# Patient Record
Sex: FEMALE | URBAN - METROPOLITAN AREA
[De-identification: names, ages, dates, MRNs, and addresses within clinical notes are randomized per-mention and may not be internally consistent; named-entity substitution may affect disease eponyms.]

---

## 2020-02-17 ENCOUNTER — HOSPITAL ENCOUNTER (OUTPATIENT)
Age: 85
Setting detail: SPECIMEN
Discharge: HOME | End: 2020-02-17
Payer: MEDICARE

## 2020-02-19 VITALS
WEIGHT: 132 LBS | DIASTOLIC BLOOD PRESSURE: 87 MMHG | SYSTOLIC BLOOD PRESSURE: 120 MMHG | RESPIRATION RATE: 18 BRPM | TEMPERATURE: 97.9 F | HEART RATE: 89 BPM

## 2020-02-19 PROBLEM — R62.7 FTT (FAILURE TO THRIVE) IN ADULT: Status: ACTIVE | Noted: 2020-02-19

## 2020-02-19 PROBLEM — S62.102A CLOSED FRACTURE OF LEFT WRIST: Status: ACTIVE | Noted: 2020-02-19

## 2020-02-19 NOTE — PROGRESS NOTES
Tommie Cordero is a 81 y.o. female that is being seen at [facility] for Other (Left arm fracture)      Tommie Cordero  1935 2/19/20      HPI:  Patient was not seen on 2/21/20 due to not being available on rounds. I have reviewed the patient's past medical history, past surgical history, allergies,medications, social and family history and I have made updates where appropriate. No past medical history on file. No past surgical history on file. Social History     Tobacco Use    Smoking status: Not on file   Substance Use Topics    Alcohol use: Not on file    Drug use: Not on file       No family history on file. Review of Systems        PHYSICAL EXAM:    /87   Pulse 89   Temp 97.9 °F (36.6 °C)   Resp 18   Wt 132 lb (59.9 kg)       Physical Exam    ASSESSMENT & PLAN  Barbie Self was seen today for other.     Diagnoses and all orders for this visit:    FTT (failure to thrive) in adult    Closed fracture of left wrist with routine healing, subsequent encounter

## 2020-02-20 LAB
CULTURE: ABNORMAL
DIRECT EXAM: ABNORMAL
Lab: ABNORMAL
SPECIMEN DESCRIPTION: ABNORMAL

## 2020-02-21 ENCOUNTER — OUTSIDE SERVICES (OUTPATIENT)
Dept: FAMILY MEDICINE CLINIC | Age: 85
End: 2020-02-21

## 2020-03-04 VITALS
HEART RATE: 64 BPM | TEMPERATURE: 98.2 F | DIASTOLIC BLOOD PRESSURE: 63 MMHG | SYSTOLIC BLOOD PRESSURE: 119 MMHG | WEIGHT: 127 LBS | RESPIRATION RATE: 18 BRPM

## 2020-03-04 PROBLEM — I82.419 DVT FEMORAL (DEEP VENOUS THROMBOSIS) (HCC): Chronic | Status: ACTIVE | Noted: 2020-03-04

## 2020-03-04 PROBLEM — D64.9 NORMOCYTIC ANEMIA: Status: ACTIVE | Noted: 2020-03-04

## 2020-03-04 PROBLEM — D50.9 MICROCYTIC ANEMIA: Chronic | Status: ACTIVE | Noted: 2020-03-04

## 2020-03-04 PROBLEM — D64.9 NORMOCYTIC ANEMIA: Status: RESOLVED | Noted: 2020-03-04 | Resolved: 2020-03-04

## 2020-03-04 PROBLEM — E03.8 OTHER SPECIFIED HYPOTHYROIDISM: Chronic | Status: ACTIVE | Noted: 2020-03-04

## 2020-03-04 PROBLEM — I21.4 NSTEMI (NON-ST ELEVATED MYOCARDIAL INFARCTION) (HCC): Status: ACTIVE | Noted: 2020-03-04

## 2020-03-04 PROBLEM — I26.99 OTHER PULMONARY EMBOLISM WITHOUT ACUTE COR PULMONALE (HCC): Chronic | Status: ACTIVE | Noted: 2020-03-04

## 2020-03-04 PROBLEM — L03.116 CELLULITIS OF LEFT LOWER EXTREMITY: Chronic | Status: ACTIVE | Noted: 2020-03-04

## 2020-03-04 NOTE — PROGRESS NOTES
Yeni Bowles is a 81 y.o. female that is being seen at [facility] for Anemia      Yeni Bowles  1935  3/4/20      HPI:  Patient seen and examined at bedside. History obtain from patient and chart. Patient was recently admitted to [facility] for treatment of anemia, PE. Per DC summary:    80 y/o patient that was admitted from NH for low HgB. Patient is somewhat poor historian, took no home medications, did not follow PCP. On admission patient Hgb 6.3, received 2u PRBC. EGD/Colonoscopy/ occult stool negative. GI planned for out patient capsule. Hgb remained stable after infusions. CXR showed Cardiomegaly. Cardiology followed d/t NSTEMI, most likely secondary to GI bleed and tachycardia. Cardiology recommended medical therapy d/t other current medical complications, recommended no no BB due to marginal BP and no aspirin d/t GI bleed. Patient BP did increase and was able to DC on BB, ECHO moderate LVH, EF 55-60%. Patient denied CP. ID consulted for cellulitis of the lower extremities, started on vancomycin, then switched to doxy. Final would culture to right leg showed Staphylococcus aureus, antibiotic changed from doxy to Keflex. Patient received last dose prior to discharge for total treatment. Patient was also being treated for lower leg cellulitis, US showed Bilateral popliteal/infrapopliteal deep venous thrombosis, d/t GI bleed of unknown source patient was not a candidate for [facility]. Patient was currently on no home oxygen, patient was requiring more oxygen from 2L up to 4L, with increase SOB, CTA showed Extensive bilateral segmental and subsegmental pulmonary emboli with clot burden somewhat more prominent to the right lung with near total occlusion of the distal aspect of the right main pulmonary artery. Hgb stable benefit of OAC out weighs risk. Patient was started on IV heparin, Pulmonology consulted recommended Warfarin. Warfarin initiated. Patient was able to be bridged with Lovenox and Coumadin back to NH. rash.   Allergic/Immunologic: Negative for environmental allergies. Neurological: Negative for dizziness, tremors, seizures, weakness and headaches. Hematological: Does not bruise/bleed easily. Psychiatric/Behavioral: Negative for hallucinations and suicidal ideas. The patient is not nervous/anxious. PHYSICAL EXAM:    /63   Pulse 64   Temp 98.2 °F (36.8 °C)   Resp 18   Wt 127 lb (57.6 kg)       Physical Exam  Vitals signs and nursing note reviewed. Constitutional:       General: She is not in acute distress. Appearance: She is well-developed. She is not diaphoretic. HENT:      Head: Normocephalic and atraumatic. Right Ear: Hearing, tympanic membrane, ear canal and external ear normal.      Left Ear: Hearing, tympanic membrane, ear canal and external ear normal.      Nose: Nose normal.      Mouth/Throat:      Dentition: Normal dentition. Pharynx: Uvula midline. Eyes:      General: Lids are normal.         Right eye: No discharge. Left eye: No discharge. Conjunctiva/sclera: Conjunctivae normal.      Pupils: Pupils are equal, round, and reactive to light. Neck:      Musculoskeletal: Normal range of motion and neck supple. Thyroid: No thyromegaly. Trachea: No tracheal deviation. Cardiovascular:      Rate and Rhythm: Normal rate and regular rhythm. Heart sounds: Normal heart sounds. No murmur. No friction rub. No gallop. Comments: No clubbing, cyanosis or edema of the LEs bilaterally  Pulmonary:      Effort: Pulmonary effort is normal. No respiratory distress. Breath sounds: Normal breath sounds. No wheezing or rales. Abdominal:      General: There is no distension. Palpations: Abdomen is soft. There is no mass. Tenderness: There is no abdominal tenderness. There is no guarding or rebound. Hernia: No hernia is present. Musculoskeletal:         General: Swelling (of the LEs bialterally) present. No tenderness.

## 2020-03-06 ENCOUNTER — OUTSIDE SERVICES (OUTPATIENT)
Dept: FAMILY MEDICINE CLINIC | Age: 85
End: 2020-03-06
Payer: MEDICARE

## 2020-03-06 PROCEDURE — 99305 1ST NF CARE MODERATE MDM 35: CPT | Performed by: FAMILY MEDICINE

## 2020-03-06 ASSESSMENT — ENCOUNTER SYMPTOMS
VOMITING: 0
NAUSEA: 0
BACK PAIN: 0
EYE DISCHARGE: 0
SORE THROAT: 0
BLOOD IN STOOL: 0
SHORTNESS OF BREATH: 0
CONSTIPATION: 0
EYE PAIN: 0
WHEEZING: 0
DIARRHEA: 0
COUGH: 0
EYE REDNESS: 0

## 2020-04-02 ENCOUNTER — OUTSIDE SERVICES (OUTPATIENT)
Dept: FAMILY MEDICINE CLINIC | Age: 85
End: 2020-04-02
Payer: MEDICARE

## 2020-04-02 PROCEDURE — 99308 SBSQ NF CARE LOW MDM 20: CPT | Performed by: NURSE PRACTITIONER

## 2020-05-07 ENCOUNTER — OUTSIDE SERVICES (OUTPATIENT)
Dept: FAMILY MEDICINE CLINIC | Age: 85
End: 2020-05-07
Payer: MEDICARE

## 2020-05-07 VITALS
OXYGEN SATURATION: 94 % | RESPIRATION RATE: 16 BRPM | DIASTOLIC BLOOD PRESSURE: 81 MMHG | TEMPERATURE: 96.8 F | HEART RATE: 73 BPM | SYSTOLIC BLOOD PRESSURE: 134 MMHG | WEIGHT: 123 LBS

## 2020-05-07 PROCEDURE — 99309 SBSQ NF CARE MODERATE MDM 30: CPT | Performed by: NURSE PRACTITIONER

## 2020-05-07 ASSESSMENT — ENCOUNTER SYMPTOMS
NAUSEA: 0
COUGH: 0
BACK PAIN: 0
ABDOMINAL DISTENTION: 0
SHORTNESS OF BREATH: 0
RHINORRHEA: 0
ABDOMINAL PAIN: 0
SORE THROAT: 0
VOMITING: 0
SINUS PRESSURE: 0

## 2020-06-08 ENCOUNTER — OUTSIDE SERVICES (OUTPATIENT)
Dept: FAMILY MEDICINE CLINIC | Age: 85
End: 2020-06-08
Payer: MEDICARE

## 2020-06-08 PROCEDURE — 99309 SBSQ NF CARE MODERATE MDM 30: CPT | Performed by: NURSE PRACTITIONER

## 2020-06-09 NOTE — PROGRESS NOTES
meters)          Stage 1        >/= 90        or > 89          Stage 2        60 - 89          Stage 3        30 - 59          Stage 4        15 - 29          Stage 5        </= 14        or < 15  ** GFR is reliable for adults 17 to 69 years with stable kidney      function.       CALCIUM 8.9 mg/dL 8.4-10.2  Final         CBC W/O DIFF        WBC 4.4 K/cmm 4.5-10.8 L Final           RBC 5.03 M/cmm 3.90-5.40  Final           HEMOGLOBIN 12.9 g/dL 12.0-16.0  Final           HEMATOCRIT 40.5 % 36.0-48.0  Final           MCV 80.5 fL 80.0-100.0  Final           MCH 25.6 pg 26.0-35.0 L Final           MCHC 31.8 g/dL 31.0-36.5  Final           RDW 22.0 % 11.0-16.0 H Final     Verified      PLATELET 337 K/cmm 290-596  Final           MPV 8.7 fL 6.5-12.0  Final           ASSESSMENT AND PLAN  1. Anemia: Labs stable. Continue on FerrouSul and following with GI   2. CAD/NSTEMI: No s/s. Continue on Toprol XL. 3. Hypothyroidism: Continue Synthroid. 4. PE/DVT: No s/s. Continue on Eliquis. 5. Closed fracture of left wrist: Continue with OT. Doing well. 6. Pain: Denies. Continue Tylenol. 7. GERD: No s/s. Continue Protonix. 8. Disposition: Stable and improving.  working with [facility] in the anticipation of getting patient discharged back out into the community in less than 90 days. Antipsychotic/Antianxiety/Hypnotic/Psychotropic/Sedation/Antidepressant medications are continued at this time because discontinuation may result in adverse effects or return of concerning behaviors/symptoms.      Plan of care reviewed with Dr Charisma oLya, CNP

## 2020-07-01 VITALS
SYSTOLIC BLOOD PRESSURE: 133 MMHG | TEMPERATURE: 98.6 F | HEART RATE: 70 BPM | RESPIRATION RATE: 16 BRPM | DIASTOLIC BLOOD PRESSURE: 84 MMHG | WEIGHT: 125 LBS

## 2020-07-01 PROBLEM — I25.10 CORONARY ARTERY DISEASE INVOLVING NATIVE CORONARY ARTERY OF NATIVE HEART WITHOUT ANGINA PECTORIS: Status: ACTIVE | Noted: 2020-07-01

## 2020-07-01 PROBLEM — I25.10 CORONARY ARTERY DISEASE INVOLVING NATIVE CORONARY ARTERY OF NATIVE HEART WITHOUT ANGINA PECTORIS: Chronic | Status: ACTIVE | Noted: 2020-07-01

## 2020-07-01 NOTE — PROGRESS NOTES
Effort: Pulmonary effort is normal. No respiratory distress. Breath sounds: Normal breath sounds. No wheezing or rales. Abdominal:      General: There is no distension. Palpations: Abdomen is soft. There is no mass. Tenderness: There is no abdominal tenderness. There is no guarding or rebound. Hernia: No hernia is present. Musculoskeletal:         General: Swelling (of the LEs bialterally) present. No tenderness. Lymphadenopathy:      Head:      Right side of head: No tonsillar or preauricular adenopathy. Left side of head: No tonsillar or preauricular adenopathy. Cervical: No cervical adenopathy. Upper Body:      Right upper body: No supraclavicular adenopathy. Left upper body: No supraclavicular adenopathy. Skin:     General: Skin is warm and dry. Findings: No erythema or rash. Neurological:      Mental Status: She is alert. Cranial Nerves: No cranial nerve deficit. Motor: No tremor or abnormal muscle tone. Coordination: Coordination normal.      Gait: Gait normal.   Psychiatric:         Behavior: Behavior normal.         Thought Content: Thought content normal.         Judgment: Judgment normal.         ASSESSMENT & PLAN  [facility] was seen today for anemia.     Diagnoses and all orders for this visit:    Microcytic anemia    Cellulitis of left lower extremity    Deep vein thrombosis (DVT) of femoral vein of left lower extremity, unspecified chronicity (HCC)    Other acute pulmonary embolism without acute cor pulmonale (HCC)    Other specified hypothyroidism    Coronary artery disease involving native coronary artery of native heart without angina pectoris         Anemia: cont FeSul and follow up with GI  CAD/NSTEMI:  Cont toprol  Hypothyroidism:  Cont levothyroxine  PE/DVT:  Cont coumadin

## 2020-07-02 ENCOUNTER — OUTSIDE SERVICES (OUTPATIENT)
Dept: FAMILY MEDICINE CLINIC | Age: 85
End: 2020-07-02
Payer: MEDICARE

## 2020-07-02 PROCEDURE — 99308 SBSQ NF CARE LOW MDM 20: CPT | Performed by: FAMILY MEDICINE

## 2020-08-07 ENCOUNTER — OUTSIDE SERVICES (OUTPATIENT)
Dept: FAMILY MEDICINE CLINIC | Age: 85
End: 2020-08-07
Payer: MEDICARE

## 2020-08-07 PROCEDURE — 99308 SBSQ NF CARE LOW MDM 20: CPT | Performed by: NURSE PRACTITIONER

## 2020-08-07 NOTE — PROGRESS NOTES
NAME: Mahogany Garnica  DATE: 2020  ROOM #: 20-1  CODE STATUS: Full   REASON FOR VISIT: Follow up of chronic medical conditions. :36  Skilled Patient?:No     HPI: Pt seen and examined at bedside. History is partially obtained from chart review. Patient up in room, alert, oriented, pleasant and cooperative. She denies any new complaints today. Stating that she is doing well, and gets up with staff to walk. Feels that the pain in her right arm and hand have greatly improved. Sleeping well at night. Appetite is good, and she is having regular bowel movements. No new concerns. PMHx: No hx on file  PSHx:No hx on file  FamHx: No hx on file     Allergies, medications, labs and radiology reports were reviewed through chart review. Patients past medical, surgical, social and family history have been reviewed and updates were made where appropriate.     Review of Systems  Positive responses are indicated with + (Denies any complaints today)    Constitutional:  Fever, Chills, Fatigue, Unexpected changes in weight  Eyes:  Eye discharge, Eye pain, Eye redness, Visual disturbances   HENT:  Ear pain, Tinnitus, Nosebleeds, Trouble swallowing  Cardiovascular:  Chest Pain, Palpitations  Respiratory:  Cough, Wheezing, Shortness of breath, Chest tightness, Apnea  Gastrointestinal:  Nausea, Vomiting, Diarrhea, Constipation, Heartburn, Blood in stool  Genitourinary:  Difficulty or painful urination, Flank pain, Change in frequency, Urgency  Skin:  Color change, Rash, Itching, Wound  Psychiatric:  Hallucinations, Anxiety, Depression, Suicidal ideation  Hematological:  Enlarged glands, Easy bleeding, Easily bruising  Musculoskeletal:  Joint pain, Back pain, Gait problems, Joint swelling, Myalgias  Neurological:  Dizziness, Headaches, Presyncope, Numbness, Seizures, Tremors  Allergy:  Environmental allergies, Food allergies  Endocrine:  Heat Intolerance, Cold Intolerance, Polydipsia, Polyphagia, because discontinuation may result in adverse effects or return of concerning behaviors/symptoms.      Plan of care reviewed with Dr Yvan Geller, CNP

## 2020-09-01 ENCOUNTER — OUTSIDE SERVICES (OUTPATIENT)
Dept: FAMILY MEDICINE CLINIC | Age: 85
End: 2020-09-01
Payer: MEDICARE

## 2020-09-01 PROCEDURE — 99308 SBSQ NF CARE LOW MDM 20: CPT | Performed by: NURSE PRACTITIONER

## 2020-09-01 NOTE — PROGRESS NOTES
NAME: Pérez Garnica  DATE: 2020  ROOM #: 20-1  CODE STATUS: Full   REASON FOR VISIT: Follow up of chronic medical conditions. :36  Skilled Patient?:No     HPI: Pt seen and examined at bedside. History is partially obtained from chart review. Patient alert and oriented x3. Pleasant and cooperative with assessment. Reports improved strength and dexterity of hand. Denies any pain, shortness of breath, or other complaints this visit. Nursing has no new concerns. PMHx: No hx on file  PSHx:No hx on file  FamHx: No hx on file     Allergies, medications, labs and radiology reports were reviewed through chart review. Patients past medical, surgical, social and family history have been reviewed and updates were made where appropriate.     Review of Systems  Positive responses are indicated with + (Denies any complaints today)    Constitutional:  Fever, Chills, Fatigue, Unexpected changes in weight  Eyes:  Eye discharge, Eye pain, Eye redness, Visual disturbances   HENT:  Ear pain, Tinnitus, Nosebleeds, Trouble swallowing  Cardiovascular:  Chest Pain, Palpitations  Respiratory:  Cough, Wheezing, Shortness of breath, Chest tightness, Apnea  Gastrointestinal:  Nausea, Vomiting, Diarrhea, Constipation, Heartburn, Blood in stool  Genitourinary:  Difficulty or painful urination, Flank pain, Change in frequency, Urgency  Skin:  Color change, Rash, Itching, Wound  Psychiatric:  Hallucinations, Anxiety, Depression, Suicidal ideation  Hematological:  Enlarged glands, Easy bleeding, Easily bruising  Musculoskeletal:  Joint pain, Back pain, Gait problems, Joint swelling, Myalgias  Neurological:  Dizziness, Headaches, Presyncope, Numbness, Seizures, Tremors  Allergy:  Environmental allergies, Food allergies  Endocrine:  Heat Intolerance, Cold Intolerance, Polydipsia, Polyphagia, Polyuria      PHYSICAL EXAM  Vital Signs: T, BP, P, RR, Wt  97.5, 145/68, 62, 18, 115#  General Appearance: well developed and well- nourished, in no acute distress  Head: normocephalic and atraumatic  Eyes: pupils equal, round, and reactive to light, conjunctivae and eye lids without erythema  ENT: external ear normal, nose without deformity, , oropharynx normal, dentition with missing teeth, no lip or gum lesions noted  Neck: supple and non-tender without mass, no thyromegaly or thyroid nodules, no cervical lymphadenopathy  Pulmonary/Chest: clear to auscultation bilaterally- no wheezes, rales or rhonchi, normal air movement, no respiratory distress or retractions  Cardiovascular: normal rate, regular rhythm, normal S1 and S2, no murmurs, rubs, clicks, or gallops, distal pulses intact  Abdomen: soft, non-tender, non-distended, no rebound or guarding, no masses or hernias noted, no hepatosplenomegaly  Extremities: no cyanosis, clubbing. Non-pitting edema of BLE. Musculoskeletal: No joint swelling or gross deformity   Neuro:  Alert,  normal speech, no focal findings or movement disorder noted  Psych:  Normal affect without evidence of depression or anxiety, insight and judgement are appropriate, memory appears intact  Skin: warm and dry, no rash or erythema    ASSESSMENT AND PLAN  1. Anemia: Labs stable. Continue on FerrouSul and following with GI   2. CAD/NSTEMI: No s/s. Continue on Toprol XL. 3. Hypothyroidism: Continue Synthroid. 4. PE/DVT: No s/s. Continue on Eliquis. 5. Closed fracture of right wrist: Improved dexterity and strength. Doing well. 6. Pain: Denies. Continue Tylenol. 7. GERD: No s/s. Continue Protonix. 8. Disposition: Stable and improving.  continue to work with [facility] in the anticipation of getting patient discharged back out into the community in less than 90 days. Antipsychotic/Antianxiety/Hypnotic/Psychotropic/Sedation/Antidepressant medications are continued at this time because discontinuation may result in adverse effects or return of concerning behaviors/symptoms.      Plan of

## 2020-10-09 ENCOUNTER — OUTSIDE SERVICES (OUTPATIENT)
Dept: FAMILY MEDICINE CLINIC | Age: 85
End: 2020-10-09
Payer: MEDICARE

## 2020-10-09 PROCEDURE — 99308 SBSQ NF CARE LOW MDM 20: CPT | Performed by: NURSE PRACTITIONER

## 2020-10-09 NOTE — PROGRESS NOTES
NAME: Janae Garnica  DATE: 10-9-2020  ROOM #: 20-1  CODE STATUS: Full   REASON FOR VISIT: Follow up of chronic medical conditions. :536  Skilled Patient?:No     HPI: Pt seen and examined at bedside. History is partially obtained from chart review. Patient alert and oriented x3. Pleasant and cooperative with assessment. More quiet today than usual and states that she was hoping to be home by now, however, she denies feeling depressed. No pain or SOB. Stating that she sleeps well most nights. No new complaints or concern. PMHx: No hx on file  PSHx:No hx on file  FamHx: No hx on file     Allergies, medications, labs and radiology reports were reviewed through chart review. Patients past medical, surgical, social and family history have been reviewed and updates were made where appropriate.     Review of Systems  Positive responses are indicated with + (Denies any complaints today)    Constitutional:  Fever, Chills, Fatigue, Unexpected changes in weight  Eyes:  Eye discharge, Eye pain, Eye redness, Visual disturbances   HENT:  Ear pain, Tinnitus, Nosebleeds, Trouble swallowing  Cardiovascular:  Chest Pain, Palpitations  Respiratory:  Cough, Wheezing, Shortness of breath, Chest tightness, Apnea  Gastrointestinal:  Nausea, Vomiting, Diarrhea, Constipation, Heartburn, Blood in stool  Genitourinary:  Difficulty or painful urination, Flank pain, Change in frequency, Urgency  Skin:  Color change, Rash, Itching, Wound  Psychiatric:  Hallucinations, Anxiety, Depression, Suicidal ideation  Hematological:  Enlarged glands, Easy bleeding, Easily bruising  Musculoskeletal:  Joint pain, Back pain, Gait problems, Joint swelling, Myalgias  Neurological:  Dizziness, Headaches, Presyncope, Numbness, Seizures, Tremors  Allergy:  Environmental allergies, Food allergies  Endocrine:  Heat Intolerance, Cold Intolerance, Polydipsia, Polyphagia, Polyuria      PHYSICAL EXAM  Vital Signs: T, BP, P, RR, Wt  98, 137/93, 60, 16, 117#  General Appearance: thin appearing elderly female in no acute distress  Head: normocephalic and atraumatic  Eyes: pupils equal, round, and reactive to light, conjunctivae and eye lids without erythema  ENT: external ear normal, nose without deformity, , oropharynx normal, dentition with missing teeth, no lip or gum lesions noted  Neck: supple and non-tender without mass, no thyromegaly or thyroid nodules, no cervical lymphadenopathy  Pulmonary/Chest: clear to auscultation bilaterally- no wheezes, rales or rhonchi, normal air movement, no respiratory distress or retractions  Cardiovascular: normal rate, regular rhythm, normal S1 and S2, no murmurs, rubs, clicks, or gallops, distal pulses intact  Abdomen: soft, non-tender, non-distended, no rebound or guarding, no masses or hernias noted, no hepatosplenomegaly  Extremities: no cyanosis, clubbing. Non-pitting edema of BLE. Musculoskeletal: No joint swelling or gross deformity   Neuro:  Alert,  normal speech, no focal findings or movement disorder noted  Psych:  Normal affect without evidence of depression or anxiety, insight and judgement are appropriate, memory appears intact  Skin: warm and dry, no rash or erythema    ASSESSMENT AND PLAN  1. Anemia: Labs stable. Continue on FerrouSul and following with GI   2. CAD/NSTEMI: No s/s. Continue on Toprol XL. 3. Hypothyroidism: Continue Synthroid. 4. PE/DVT: No s/s. Continue on Eliquis. 5. Closed fracture of right wrist: Improved dexterity and strength. No new orders. 6. Pain: Denies. Continue Tylenol. 7. GERD: No s/s. Continue Protonix. 8. Disposition: Stable and improving.  continue to work with [facility] in the anticipation of getting patient discharged back out into the community in less than 90 days. There have been delays, however the plan is for patient to be ultimately be discharged.      Antipsychotic/Antianxiety/Hypnotic/Psychotropic/Sedation/Antidepressant medications are continued at this time because discontinuation may result in adverse effects or return of concerning behaviors/symptoms.      Plan of care reviewed with Dr Richi Ayers, CNP

## 2020-11-06 ENCOUNTER — OUTSIDE SERVICES (OUTPATIENT)
Dept: FAMILY MEDICINE CLINIC | Age: 85
End: 2020-11-06
Payer: MEDICARE

## 2020-11-06 VITALS
RESPIRATION RATE: 16 BRPM | HEART RATE: 58 BPM | DIASTOLIC BLOOD PRESSURE: 68 MMHG | WEIGHT: 117 LBS | TEMPERATURE: 98.2 F | SYSTOLIC BLOOD PRESSURE: 138 MMHG

## 2020-11-06 PROCEDURE — 99315 NF DSCHRG MGMT 30 MIN/LESS: CPT | Performed by: FAMILY MEDICINE

## 2020-11-06 NOTE — PROGRESS NOTES
Misti An is a 81 y.o. female that is being seen at [facility] for Coronary Artery Disease      Misti An  1935 11/6/20      HPI:  Patient seen and examined at bedside. History obtain from patient and chart. Patient states that she is doing well, denies any acute issues. Denies CP, SOB, Constipation. Eating well. She is discharging today. I have reviewed the patient's past medical history, past surgical history, allergies,medications, social and family history and I have made updates where appropriate. No past medical history on file. No past surgical history on file. Social History     Tobacco Use    Smoking status: Not on file   Substance Use Topics    Alcohol use: Not on file    Drug use: Not on file       No family history on file. Review of Systems        PHYSICAL EXAM:    /68   Pulse 58   Temp 98.2 °F (36.8 °C)   Resp 16   Wt 117 lb (53.1 kg)       Physical Exam  Vitals signs and nursing note reviewed. Constitutional:       General: She is not in acute distress. Appearance: She is well-developed. She is not diaphoretic. HENT:      Head: Normocephalic and atraumatic. Right Ear: Hearing, tympanic membrane, ear canal and external ear normal.      Left Ear: Hearing, tympanic membrane, ear canal and external ear normal.      Nose: Nose normal.      Mouth/Throat:      Dentition: Normal dentition. Pharynx: Uvula midline. Eyes:      General: Lids are normal.         Right eye: No discharge. Left eye: No discharge. Conjunctiva/sclera: Conjunctivae normal.      Pupils: Pupils are equal, round, and reactive to light. Neck:      Musculoskeletal: Normal range of motion and neck supple. Thyroid: No thyromegaly. Trachea: No tracheal deviation. Cardiovascular:      Rate and Rhythm: Normal rate and regular rhythm. Heart sounds: Normal heart sounds. No murmur. No friction rub. No gallop.        Comments: No clubbing, cyanosis or edema of the LEs bilaterally  Pulmonary:      Effort: Pulmonary effort is normal. No respiratory distress. Breath sounds: Normal breath sounds. No wheezing or rales. Abdominal:      General: There is no distension. Palpations: Abdomen is soft. There is no mass. Tenderness: There is no abdominal tenderness. There is no guarding or rebound. Hernia: No hernia is present. Musculoskeletal:         General: Swelling (of the LEs bialterally) present. No tenderness. Lymphadenopathy:      Head:      Right side of head: No tonsillar or preauricular adenopathy. Left side of head: No tonsillar or preauricular adenopathy. Cervical: No cervical adenopathy. Upper Body:      Right upper body: No supraclavicular adenopathy. Left upper body: No supraclavicular adenopathy. Skin:     General: Skin is warm and dry. Findings: No erythema or rash. Neurological:      Mental Status: She is alert. Cranial Nerves: No cranial nerve deficit. Motor: No tremor or abnormal muscle tone. Coordination: Coordination normal.      Gait: Gait normal.   Psychiatric:         Behavior: Behavior normal.         Thought Content: Thought content normal.         Judgment: Judgment normal.         ASSESSMENT & PLAN  Ramona Wallace was seen today for coronary artery disease.     Diagnoses and all orders for this visit:    Microcytic anemia    Cellulitis of left lower extremity    Deep vein thrombosis (DVT) of femoral vein of left lower extremity, unspecified chronicity ([address]    Other pulmonary embolism without acute cor pulmonale, unspecified chronicity (HCC)    Other specified hypothyroidism    Coronary artery disease involving native coronary artery of native heart without angina pectoris         Anemia: cont FeSul and follow up with GI  CAD/NSTEMI:  Cont toprol  Hypothyroidism:  Cont levothyroxine  PE/DVT:  Cont coumadin    Approximately 10 minutes were spent in discharge planning for patient